# Patient Record
Sex: FEMALE | Race: WHITE
[De-identification: names, ages, dates, MRNs, and addresses within clinical notes are randomized per-mention and may not be internally consistent; named-entity substitution may affect disease eponyms.]

---

## 2020-01-18 ENCOUNTER — HOSPITAL ENCOUNTER (INPATIENT)
Dept: HOSPITAL 74 - JER | Age: 20
LOS: 1 days | Discharge: HOME | DRG: 560 | End: 2020-01-19
Attending: OBSTETRICS & GYNECOLOGY | Admitting: OBSTETRICS & GYNECOLOGY
Payer: SELF-PAY

## 2020-01-18 VITALS — BODY MASS INDEX: 32.5 KG/M2

## 2020-01-18 DIAGNOSIS — O99.212: ICD-10-CM

## 2020-01-18 DIAGNOSIS — Z3A.24: ICD-10-CM

## 2020-01-18 LAB
ALBUMIN SERPL-MCNC: 3.1 G/DL (ref 3.4–5)
ALP SERPL-CCNC: 172 U/L (ref 45–117)
ALT SERPL-CCNC: 27 U/L (ref 13–61)
AMPHET UR-MCNC: NEGATIVE NG/ML
ANION GAP SERPL CALC-SCNC: 10 MMOL/L (ref 8–16)
APPEARANCE UR: (no result)
APTT BLD: 30.5 SECONDS (ref 25.2–36.5)
AST SERPL-CCNC: 15 U/L (ref 15–37)
BACTERIA # UR AUTO: 97 /HPF
BARBITURATES UR-MCNC: NEGATIVE NG/ML
BASOPHILS # BLD: 0.3 % (ref 0–2)
BENZODIAZ UR SCN-MCNC: NEGATIVE NG/ML
BILIRUB SERPL-MCNC: 0.5 MG/DL (ref 0.2–1)
BILIRUB UR STRIP.AUTO-MCNC: NEGATIVE MG/DL
BUN SERPL-MCNC: 5.7 MG/DL (ref 7–18)
CALCIUM SERPL-MCNC: 9.1 MG/DL (ref 8.5–10.1)
CASTS URNS QL MICRO: 6 /LPF (ref 0–8)
CHLORIDE SERPL-SCNC: 106 MMOL/L (ref 98–107)
CO2 SERPL-SCNC: 22 MMOL/L (ref 21–32)
COCAINE UR-MCNC: NEGATIVE NG/ML
COLOR UR: (no result)
CREAT SERPL-MCNC: 0.6 MG/DL (ref 0.55–1.3)
DEPRECATED RDW RBC AUTO: 12.3 % (ref 11.6–15.6)
EOSINOPHIL # BLD: 0 % (ref 0–4.5)
EPITH CASTS URNS QL MICRO: 4 /HPF
GLUCOSE SERPL-MCNC: 90 MG/DL (ref 74–106)
HCT VFR BLD CALC: 35.4 % (ref 32.4–45.2)
HGB BLD-MCNC: 12.1 GM/DL (ref 10.7–15.3)
INR BLD: 1.01 (ref 0.83–1.09)
KETONES UR QL STRIP: NEGATIVE
LEUKOCYTE ESTERASE UR QL STRIP.AUTO: (no result)
LYMPHOCYTES # BLD: 4.6 % (ref 8–40)
MCH RBC QN AUTO: 32.6 PG (ref 25.7–33.7)
MCHC RBC AUTO-ENTMCNC: 34.3 G/DL (ref 32–36)
MCV RBC: 95.2 FL (ref 80–96)
METHADONE UR-MCNC: NEGATIVE NG/ML
MONOCYTES # BLD AUTO: 5.5 % (ref 3.8–10.2)
NEUTROPHILS # BLD: 89.6 % (ref 42.8–82.8)
NITRITE UR QL STRIP: NEGATIVE
OPIATES UR QL SCN: NEGATIVE NG/ML
PCP UR QL SCN: NEGATIVE NG/ML
PH UR: 7 [PH] (ref 5–8)
PLATELET # BLD AUTO: 327 K/MM3 (ref 134–434)
PLATELET BLD QL SMEAR: ADEQUATE
PMV BLD: 8 FL (ref 7.5–11.1)
POTASSIUM SERPLBLD-SCNC: 3.9 MMOL/L (ref 3.5–5.1)
PROT SERPL-MCNC: 7.1 G/DL (ref 6.4–8.2)
PROT UR QL STRIP: (no result)
PROT UR QL STRIP: NEGATIVE
PT PNL PPP: 11.9 SEC (ref 9.7–13)
RBC # BLD AUTO: 3.72 M/MM3 (ref 3.6–5.2)
RBC # BLD AUTO: 89 /HPF (ref 0–4)
SODIUM SERPL-SCNC: 138 MMOL/L (ref 136–145)
SP GR UR: 1.02 (ref 1.01–1.03)
UROBILINOGEN UR STRIP-MCNC: 1 MG/DL (ref 0.2–1)
WBC # BLD AUTO: 25.1 K/MM3 (ref 4–10)
WBC # UR AUTO: 3 /HPF (ref 0–5)

## 2020-01-18 RX ADMIN — ACETAMINOPHEN PRN MG: 325 TABLET ORAL at 17:45

## 2020-01-18 RX ADMIN — IBUPROFEN PRN MG: 600 TABLET, FILM COATED ORAL at 17:45

## 2020-01-18 RX ADMIN — IBUPROFEN PRN MG: 600 TABLET, FILM COATED ORAL at 23:58

## 2020-01-18 RX ADMIN — FERROUS SULFATE TAB EC 324 MG (65 MG FE EQUIVALENT) SCH: 324 (65 FE) TABLET DELAYED RESPONSE at 17:30

## 2020-01-18 RX ADMIN — ACETAMINOPHEN PRN MG: 325 TABLET ORAL at 23:56

## 2020-01-18 NOTE — PDOC
History of Present Illness





- General


Chief Complaint: Labor Assessment


Stated Complaint: 6 MONTHS PREG/PAIN


History Source: Patient


Exam Limitations: No Limitations





- History of Present Illness


Initial Comments: 





20 15:07


Patient is 20F at 23wks here today with vaginal bleeding, cramping lower 

abdominal pain. Patient has not pushed, has not felt any parts pass.





Vitals stable. Not . Given reported gestational age, will transfer to 

labor and delivery.





Past History





- Past Medical History


Allergies/Adverse Reactions: 


 Allergies











Allergy/AdvReac Type Severity Reaction Status Date / Time


 


No Known Allergies Allergy   Verified 20 15:12














Discharge





- Discharge Information


Problems reviewed: No


Clinical Impression/Diagnosis: 


 Pregnancy





Condition: Stable





- Follow up/Referral





- Patient Discharge Instructions





- Post Discharge Activity

## 2020-01-18 NOTE — PN
Delivery





- Delivery


Vaginal Delivery: No Problems, Spontaneous (pt srom , large amount of clear 

fluid & pushed the fetus, baby boy  out in vx presentation  at the same time ,, 

cord clamped cut, cord segment cut for cord gas, cord blood minimal obtained .. 

Placenta expelled at 4.30 PM)


EBL (cc): 250





Delivery, Single Birth





- Stages of Labor


Date 1st Stage Initiatied: 20


Time 1st Stage Initiated: 08:00 (severe pain 2.45 pm 20 , called EMT )


Date 2nd Stage Initiated: 20


Time 2nd Stage Initiated: 16:15


Date of Delivery: 20


Time of Delivery: 16:20


Date Placenta Delivered: 20


Time Placenta Delivered: 16:30


Placenta: Yes: Spontaneous, Uterine Exploration





- Condition of Infant


Pediatrician/Neonatologist Present: Yes


Name: Avila Garcia


Infant Gender: Male


Birth Weight: 1 lb 4 oz (561gm, 26 cm long)


Position: OA





- Apgar


  ** 1 Minute


Apgar Total Score: 1





  ** 5 Minutes


Apgar Total Score: 1





  ** 10 Minutes


Apgar Total Score: 1





- [place] Feeding Plan


Initial Plan: Exclusive breastfeeding throughout hospitalization





Remarks





- Remarks


Remarks: 


20 yrs  , 24 weeks by sono , , 18.5 weeks by dates  admitted with 

bulging membranes .


pt SROM, &  





baby eyes fused, unable to resuscitate baby as per neonatologist

## 2020-01-19 VITALS — HEART RATE: 96 BPM | DIASTOLIC BLOOD PRESSURE: 75 MMHG | SYSTOLIC BLOOD PRESSURE: 127 MMHG

## 2020-01-19 VITALS — TEMPERATURE: 97.6 F

## 2020-01-19 LAB
BASOPHILS # BLD: 0.4 % (ref 0–2)
DEPRECATED RDW RBC AUTO: 12.3 % (ref 11.6–15.6)
EOSINOPHIL # BLD: 2.6 % (ref 0–4.5)
HCT VFR BLD CALC: 31.2 % (ref 32.4–45.2)
HGB BLD-MCNC: 10.7 GM/DL (ref 10.7–15.3)
LYMPHOCYTES # BLD: 21 % (ref 8–40)
MCH RBC QN AUTO: 32.8 PG (ref 25.7–33.7)
MCHC RBC AUTO-ENTMCNC: 34.5 G/DL (ref 32–36)
MCV RBC: 95.1 FL (ref 80–96)
MONOCYTES # BLD AUTO: 7.9 % (ref 3.8–10.2)
NEUTROPHILS # BLD: 68.1 % (ref 42.8–82.8)
PLATELET # BLD AUTO: 281 K/MM3 (ref 134–434)
PMV BLD: 7.5 FL (ref 7.5–11.1)
RBC # BLD AUTO: 3.28 M/MM3 (ref 3.6–5.2)
WBC # BLD AUTO: 17.3 K/MM3 (ref 4–10)

## 2020-01-19 RX ADMIN — FERROUS SULFATE TAB EC 324 MG (65 MG FE EQUIVALENT) SCH MG: 324 (65 FE) TABLET DELAYED RESPONSE at 10:27

## 2020-01-19 NOTE — DS
Physical Exam-GYN


Vital Signs: 


 Vital Signs











Temperature  97.6 F   20 06:00


 


Pulse Rate  65   20 06:00


 


Respiratory Rate  18   20 06:00


 


Blood Pressure  100/47 L  20 06:00


 


O2 Sat by Pulse Oximetry (%)  99   20 20:15











Constitutional: Yes: Well Nourished, Obese, Other (c/o cramps)


Eyes: Yes: WNL


HENT: Yes: WNL


Neck: Yes: WNL


Cardiovascular: Yes: WNL


Respiratory: Yes: WNL


Gastrointestinal: Yes: WNL


Renal/: Yes: WNL.  No: CVA Tenderness - Left, CVA Tenderness - Right


....Post Partum: Yes: Uterus firm, Uterus non-tender, Moderate lochia rubra (

perineum intact)


Breast(s): Yes: WNL


Musculoskeletal: Yes: WNL


Extremities: Yes: WNL.  No: Calf Tenderness


Edema: Yes


Edema: LLE: 1+, RLE: 1+


Integumentary: Yes: Tattoos


Neurological: Yes: WNL, Alert, Oriented


...Motor Strength: WNL


Psychiatric: Yes: WNL, Alert, Oriented, Other (adjusted well to  ( 23-

24 wk ) demise)


Labs: 


 CBC, BMP





 20 07:58 





 20 22:30 





 Laboratory Tests











  20





  16:50 19:28 22:21


 


U Marijuana (THC) Screen  Positive A*  


 


RPR Titer    Nonreactive


 


HIV 1&2 Antibody Screen   Negative 


 


HIV P24 Antigen   Negative 














Delivery





- Delivery


Vaginal Delivery: No Problems, Spontaneous (pt srom , large amount of clear 

fluid & pushed the fetus, baby boy  out in vx presentation  at the same time ,, 

cord clamped cut, cord segment cut for cord gas, cord blood minimal obtained .. 

Placenta expelled at 4.30 PM)


Type of Anesthesia: None


Episiotomy/Laceration: None


EBL (cc): 250





Delivery, Single Birth





- Stages of Labor


Date 1st Stage Initiatied: 20


Time 1st Stage Initiated: 08:00 (severe pain 2.45 pm 20 , called EMT )


Date 2nd Stage Initiated: 20


Time 2nd Stage Initiated: 16:15


Date of Delivery: 20


Time of Delivery: 16:20


Time Placenta Delivered: 16:30


Placenta: Yes: Spontaneous, Uterine Exploration





- Condition of Infant


Pediatrician/Neonatologist Present: Yes


Name: Avila Garcia


Infant Gender: Male


Birth Weight: 1 lb 4 oz (561gm, 26 cm long)


Position: OA


Total Hours ROM (Hrs/Mins): 0HRS/5MINS





- Apgar


  ** 1 Minute


Apgar Total Score: 1





  ** 5 Minutes


Apgar Total Score: 1





  ** 10 Minutes


Apgar Total Score: 1





- [place] Feeding Plan


Initial Plan: Exclusive breastfeeding throughout hospitalization





Remarks





- Remarks


Remarks: 


20 yrs  , 24 weeks by sono , , 18.5 weeks by dates  admitted with 

bulging membranes .


pt SROM, &  





baby eyes fused, unable to resuscitate baby as per neonatologist 


baby was pronounced dead after couple hrs 





pp course uneventfu


she requests discharge today 














Discharge Summary


Problems reviewed: No


Reason For Visit:  LABOR


Current Active Problems





24 weeks gestation of pregnancy (Acute)


Pregnancy (Acute)


 labor in second trimester (Acute)








Condition: Stable





- Instructions





- Home Medications


Comprehensive Discharge Medication List: 


Ambulatory Orders





Prenat 115/Iron Fum/Folic/Dss [Prenatal 19 Tablet] 1 tab PO DAILY 20

## 2020-01-22 NOTE — PATH
Surgical Pathology Report



Patient Name:  RADHA CLARK

Accession #:  

Med. Rec. #:  [id number]                                                        

   /Age/Gender:  2000 (Age: 20) / F

Account:  [id number]                                                          

             Location: [facility] OBS/GYN

Taken:  2020

Received:  2020

Reported:  2020

Physicians:  Julia Meraz M.D.

  



Specimen(s) Received

 PLACENTA 





Clinical History

, 24 weeks







Final Diagnosis

PLACENTA, DELIVERY:

211 G SECOND TRIMESTER PLACENTA WITH TRIVASCULAR UMBILICAL CORD, SEVERE ACUTE

CHORIOAMNIONITIS, PANVASCULITIS AND ASSOCIATED FUNISITIS.





***Electronically Signed***

Winifred Smith M.D.





Gross Description

The specimen is received fresh labeled placenta and is a 211 gram, 15.5 x 11.0 x

2.0 cm. placenta with attached membranes and umbilical cord.  The attached

membranes are tan, thick, cloudy and insert marginally.  The umbilical cord

measures 10.5 cm. in length and averages 1 cm. in diameter.  The cord inserts

eccentrically, 2 cm. to the nearest margin.  No true knots or strictures are

identified. Cut surface of the umbilical cord reveals 3 vessels. The fetal

surface is grey-blue with minimal fibrin deposition and appropriate caliber

vessels.  The maternal surface is red-brown with focal defects.  Sectioning

reveals red-brown, spongy parenchyma.  No lesions are identified. 

Representative sections are submitted in three cassettes as follows: 1- membrane

rolls and umbilical cord; 2-3- full thickness sections of placenta.





[facility]